# Patient Record
Sex: FEMALE | Race: BLACK OR AFRICAN AMERICAN | NOT HISPANIC OR LATINO
[De-identification: names, ages, dates, MRNs, and addresses within clinical notes are randomized per-mention and may not be internally consistent; named-entity substitution may affect disease eponyms.]

---

## 2023-03-27 PROBLEM — Z00.00 ENCOUNTER FOR PREVENTIVE HEALTH EXAMINATION: Status: ACTIVE | Noted: 2023-03-27

## 2023-03-28 ENCOUNTER — APPOINTMENT (OUTPATIENT)
Dept: PULMONOLOGY | Facility: CLINIC | Age: 48
End: 2023-03-28
Payer: COMMERCIAL

## 2023-03-28 VITALS
OXYGEN SATURATION: 98 % | SYSTOLIC BLOOD PRESSURE: 112 MMHG | DIASTOLIC BLOOD PRESSURE: 76 MMHG | WEIGHT: 168 LBS | BODY MASS INDEX: 27 KG/M2 | HEART RATE: 66 BPM | HEIGHT: 66 IN

## 2023-03-28 PROCEDURE — 99203 OFFICE O/P NEW LOW 30 MIN: CPT | Mod: 25

## 2023-03-28 PROCEDURE — 94729 DIFFUSING CAPACITY: CPT

## 2023-03-28 PROCEDURE — 94010 BREATHING CAPACITY TEST: CPT

## 2023-03-28 PROCEDURE — 94726 PLETHYSMOGRAPHY LUNG VOLUMES: CPT

## 2023-03-28 PROCEDURE — ZZZZZ: CPT

## 2023-03-28 NOTE — HISTORY OF PRESENT ILLNESS
[Never] : never [TextBox_4] : 47-year-old woman who I believe is here for evaluation for latent tuberculosis.\par \par Patient does not have any paperwork or results with her.\par \par Patient is from [facility].  She moved here in 2016.  She has received BCG vaccination.  It sounds like in 2016 as part of her work testing she had 2 TST's per the standard, the first was negative but the second was positive.  She had a chest x-ray which apparently was okay and was cleared for work.  Since that time she has not had any follow-up TSTs, which would be consistent with a positive test, and only a chest x-ray is it sounds like every other year.\par \par It seems that for work, she again required clearance but this time it sounds like likely had added QuantiFERON gold test which my guess is came back positive based on the history.  Again I do not have results.  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  She has no unintentional weight loss, no cough, no hemoptysis, no night sweats, no constitutional symptoms.\par \par She is a lifelong non-smoker.  Also does not have significant alcohol intake

## 2023-03-28 NOTE — DISCUSSION/SUMMARY
[FreeTextEntry1] : From what I can ascertain without records, it seems that this is a healthy 47-year-old woman from [facility] with a known history of positive TST, now with a positive QuantiFERON gold test likely confirming the diagnosis of latent TB.  And sounds like had a negative chest x-ray on multiple occasions and is asymptomatic.  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  However, given the fact that she is asymptomatic and if she does not fact have a negative x-ray, she is not obligated to take this and therefore we could continue her discussion for an educated decision.\par \par I have left a message at [facility] office to please send me records to [facility]

## 2023-03-29 ENCOUNTER — NON-APPOINTMENT (OUTPATIENT)
Age: 48
End: 2023-03-29

## 2023-06-07 ENCOUNTER — APPOINTMENT (OUTPATIENT)
Dept: PULMONOLOGY | Facility: CLINIC | Age: 48
End: 2023-06-07
Payer: COMMERCIAL

## 2023-06-07 VITALS
SYSTOLIC BLOOD PRESSURE: 125 MMHG | HEIGHT: 66 IN | TEMPERATURE: 97.2 F | WEIGHT: 166.5 LBS | BODY MASS INDEX: 26.76 KG/M2 | RESPIRATION RATE: 15 BRPM | DIASTOLIC BLOOD PRESSURE: 79 MMHG | OXYGEN SATURATION: 98 % | HEART RATE: 71 BPM

## 2023-06-07 PROCEDURE — 99213 OFFICE O/P EST LOW 20 MIN: CPT

## 2023-06-07 NOTE — ASSESSMENT
[FreeTextEntry1] : 48F PMHx of cervical CA (13 years ago; s/p chemoradiation) and latent TB here to start her LTB treatment. Patient is from [facility]. She moved here in 2016. She has received BCG vaccination. Denies any fever, chills, night sweats or weight loss. No history of liver disease. No history of etoh abuse. Does not take any herbal supplements. She is a lifelong non-smoker.  \par \par #Latent Tuberculosis \par -Obtain baseline CMP.\par -Start Rifampin 600 mg po daily. \par -Repeat labs in 1 month prior to appointment. If stable, patient can move forward and complete full 4 month course. \par -Discussed the side effect profile of Rifampin at length.\par -RTC in 1 month.\par

## 2023-06-07 NOTE — HISTORY OF PRESENT ILLNESS
[TextBox_4] : 48F PMHx of cervical CA (13 years ago; s/p chemoradiation) and latent TB here to start her LTB treatment. Patient is from [facility]. She moved here in 2016. She has received BCG vaccination. Denies any fever, chills, night sweats or weight loss. No history of liver disease. No history of etoh abuse. Does not take any herbal supplements. She is a lifelong non-smoker.  \par

## 2023-06-07 NOTE — PHYSICAL EXAM
[No Acute Distress] : no acute distress [Normal Oropharynx] : normal oropharynx [Normal Appearance] : normal appearance [No Neck Mass] : no neck mass [Normal Rate/Rhythm] : normal rate/rhythm [Normal S1, S2] : normal s1, s2 [No Murmurs] : no murmurs [No Resp Distress] : no resp distress [Clear to Auscultation Bilaterally] : clear to auscultation bilaterally [No Edema] : no edema [No Focal Deficits] : no focal deficits [Normal Mood] : normal mood [Normal Affect] : normal affect

## 2023-06-08 LAB
ALBUMIN SERPL ELPH-MCNC: 4.5 G/DL
ALP BLD-CCNC: 98 U/L
ALT SERPL-CCNC: 31 U/L
ANION GAP SERPL CALC-SCNC: 10 MMOL/L
AST SERPL-CCNC: 27 U/L
BILIRUB SERPL-MCNC: <0.2 MG/DL
BUN SERPL-MCNC: 11 MG/DL
CALCIUM SERPL-MCNC: 9.8 MG/DL
CHLORIDE SERPL-SCNC: 104 MMOL/L
CO2 SERPL-SCNC: 29 MMOL/L
CREAT SERPL-MCNC: 1.08 MG/DL
EGFR: 63 ML/MIN/1.73M2
GLUCOSE SERPL-MCNC: 116 MG/DL
POTASSIUM SERPL-SCNC: 4.8 MMOL/L
PROT SERPL-MCNC: 6.7 G/DL
SODIUM SERPL-SCNC: 143 MMOL/L

## 2023-08-02 ENCOUNTER — APPOINTMENT (OUTPATIENT)
Dept: PULMONOLOGY | Facility: CLINIC | Age: 48
End: 2023-08-02
Payer: COMMERCIAL

## 2023-08-02 VITALS
TEMPERATURE: 98.4 F | OXYGEN SATURATION: 96 % | DIASTOLIC BLOOD PRESSURE: 77 MMHG | HEART RATE: 63 BPM | WEIGHT: 161.25 LBS | BODY MASS INDEX: 25.91 KG/M2 | RESPIRATION RATE: 15 BRPM | SYSTOLIC BLOOD PRESSURE: 119 MMHG | HEIGHT: 66 IN

## 2023-08-02 DIAGNOSIS — Z22.7 LATENT TUBERCULOSIS: ICD-10-CM

## 2023-08-02 PROCEDURE — 99213 OFFICE O/P EST LOW 20 MIN: CPT

## 2023-08-02 NOTE — DISCUSSION/SUMMARY
[FreeTextEntry1] : 49YO [place] Female never smoker PMH cervical CA (13 years ago; s/p chemoradiation) with latent TB (on Rifampin) who presents for follow for LTBI.  #Class II TB (LTBI) - Quantiferon positive 11/2022 - CXR negative 11/2022 - HIV negative 11/2022 - Started on Rifampin 600mg daily 6/7/2023 to finish 10/7/2023- she remains asymptomatic and has been compliant without adverse effects - Will check CMP today to assess LFTs - Will refill 2 month supply - Once done will give completion letter - Elevated glucose on labs from 6/7/23- will obtain A1C- she will follow up results with her PCP Dr. Hugo Villalobos  Case discussed with Dr. Lisker

## 2023-08-02 NOTE — HISTORY OF PRESENT ILLNESS
[TextBox_4] : 47YO [place] Female never smoker PMH cervical CA (13 years ago; s/p chemoradiation) with latent TB (on Rifampin) who presents for follow for LTBI.  Patient is from [facility]. She moved here in 2016. She has received BCG vaccination. Quantiferon positive 11/2022. CXR negative 11/2022. HIV negative 11/2022. She was started on Rifampin 600mg daily at her last visit 6/7/2023. Since then she reports she has been able to take the medicine every day. She denies nausea or vomiting or GI upset.  She denies fevers, chills, cough, sputum production, weight loss.  CMP done 6/7/2023 was within normal limits aside from elevated glucose

## 2023-08-02 NOTE — PHYSICAL EXAM
[No Acute Distress] : no acute distress [Normal Rate/Rhythm] : normal rate/rhythm [Normal S1, S2] : normal s1, s2 [No Resp Distress] : no resp distress [Clear to Auscultation Bilaterally] : clear to auscultation bilaterally [Normal Gait] : normal gait [No Clubbing] : no clubbing [No Edema] : no edema [Oriented x3] : oriented x3

## 2023-08-02 NOTE — REVIEW OF SYSTEMS
[Headache] : headache [Fever] : no fever [Chills] : no chills [Cough] : no cough [Hemoptysis] : no hemoptysis [Sputum] : no sputum [Chest Discomfort] : no chest discomfort [Abdominal Pain] : no abdominal pain [Nausea] : no nausea [Vomiting] : no vomiting [Diarrhea] : no diarrhea

## 2023-08-09 ENCOUNTER — RX RENEWAL (OUTPATIENT)
Age: 48
End: 2023-08-09

## 2023-08-09 LAB
ALBUMIN SERPL ELPH-MCNC: 4.7 G/DL
ALP BLD-CCNC: 110 U/L
ALT SERPL-CCNC: 52 U/L
ANION GAP SERPL CALC-SCNC: 11 MMOL/L
AST SERPL-CCNC: 40 U/L
BILIRUB SERPL-MCNC: 0.2 MG/DL
BUN SERPL-MCNC: 11 MG/DL
CALCIUM SERPL-MCNC: 9.7 MG/DL
CHLORIDE SERPL-SCNC: 105 MMOL/L
CO2 SERPL-SCNC: 27 MMOL/L
CREAT SERPL-MCNC: 0.86 MG/DL
EGFR: 82 ML/MIN/1.73M2
ESTIMATED AVERAGE GLUCOSE: 126 MG/DL
GLUCOSE SERPL-MCNC: 111 MG/DL
HBA1C MFR BLD HPLC: 6 %
POTASSIUM SERPL-SCNC: 4.7 MMOL/L
PROT SERPL-MCNC: 6.8 G/DL
SODIUM SERPL-SCNC: 142 MMOL/L

## 2023-08-09 RX ORDER — RIFAMPIN 300 MG/1
300 CAPSULE ORAL DAILY
Qty: 60 | Refills: 0 | Status: ACTIVE | COMMUNITY
Start: 2023-06-07 | End: 1900-01-01